# Patient Record
Sex: MALE | Race: OTHER | Employment: UNEMPLOYED | URBAN - METROPOLITAN AREA
[De-identification: names, ages, dates, MRNs, and addresses within clinical notes are randomized per-mention and may not be internally consistent; named-entity substitution may affect disease eponyms.]

---

## 2020-09-09 PROBLEM — N40.1 BENIGN PROSTATIC HYPERPLASIA WITH LOWER URINARY TRACT SYMPTOMS: Status: ACTIVE | Noted: 2020-08-02

## 2020-09-09 PROBLEM — F10.929 ALCOHOL INTOXICATION (HCC): Status: ACTIVE | Noted: 2020-08-02

## 2020-09-09 PROBLEM — R31.0 GROSS HEMATURIA: Status: ACTIVE | Noted: 2020-07-29

## 2020-09-09 PROBLEM — K76.6 PORTAL HYPERTENSION (HCC): Status: ACTIVE | Noted: 2020-08-02

## 2020-09-09 PROBLEM — E87.8 ELECTROLYTE DEPLETION: Status: ACTIVE | Noted: 2020-08-02

## 2020-09-09 PROBLEM — K72.90 HEPATIC ENCEPHALOPATHY (HCC): Status: ACTIVE | Noted: 2020-08-02

## 2020-09-09 PROBLEM — I10 HYPERTENSION: Status: ACTIVE | Noted: 2020-08-02

## 2020-09-09 PROBLEM — K70.30 ALC (ALCOHOLIC LIVER CIRRHOSIS) (HCC): Status: ACTIVE | Noted: 2020-07-28

## 2020-11-20 ENCOUNTER — LAB ENCOUNTER (OUTPATIENT)
Dept: LAB | Facility: HOSPITAL | Age: 55
End: 2020-11-20
Attending: INTERNAL MEDICINE
Payer: MEDICAID

## 2020-11-20 DIAGNOSIS — Z01.812 ENCOUNTER FOR PRE-OPERATIVE LABORATORY TESTING: ICD-10-CM

## 2020-11-23 ENCOUNTER — HOSPITAL ENCOUNTER (OUTPATIENT)
Facility: HOSPITAL | Age: 55
Setting detail: HOSPITAL OUTPATIENT SURGERY
Discharge: HOME | End: 2020-11-23
Attending: INTERNAL MEDICINE | Admitting: INTERNAL MEDICINE
Payer: MEDICAID

## 2020-11-23 ENCOUNTER — ANESTHESIA (OUTPATIENT)
Dept: ENDOSCOPY | Facility: HOSPITAL | Age: 55
End: 2020-11-23
Payer: MEDICAID

## 2020-11-23 ENCOUNTER — ANESTHESIA EVENT (OUTPATIENT)
Dept: ENDOSCOPY | Facility: HOSPITAL | Age: 55
End: 2020-11-23
Payer: MEDICAID

## 2020-11-23 VITALS
SYSTOLIC BLOOD PRESSURE: 147 MMHG | HEART RATE: 113 BPM | RESPIRATION RATE: 18 BRPM | OXYGEN SATURATION: 96 % | DIASTOLIC BLOOD PRESSURE: 91 MMHG | TEMPERATURE: 99 F | BODY MASS INDEX: 40.18 KG/M2 | WEIGHT: 250 LBS | HEIGHT: 66 IN

## 2020-11-23 DIAGNOSIS — Z12.11 COLON CANCER SCREENING: ICD-10-CM

## 2020-11-23 DIAGNOSIS — Z01.812 ENCOUNTER FOR PRE-OPERATIVE LABORATORY TESTING: Primary | ICD-10-CM

## 2020-11-23 DIAGNOSIS — K74.60 CIRRHOSIS OF LIVER WITHOUT ASCITES, UNSPECIFIED HEPATIC CIRRHOSIS TYPE (HCC): ICD-10-CM

## 2020-11-23 PROCEDURE — 88305 TISSUE EXAM BY PATHOLOGIST: CPT | Performed by: INTERNAL MEDICINE

## 2020-11-23 PROCEDURE — 88312 SPECIAL STAINS GROUP 1: CPT | Performed by: INTERNAL MEDICINE

## 2020-11-23 PROCEDURE — 0DB68ZX EXCISION OF STOMACH, VIA NATURAL OR ARTIFICIAL OPENING ENDOSCOPIC, DIAGNOSTIC: ICD-10-PCS | Performed by: INTERNAL MEDICINE

## 2020-11-23 RX ORDER — SODIUM CHLORIDE, SODIUM LACTATE, POTASSIUM CHLORIDE, CALCIUM CHLORIDE 600; 310; 30; 20 MG/100ML; MG/100ML; MG/100ML; MG/100ML
INJECTION, SOLUTION INTRAVENOUS CONTINUOUS
Status: DISCONTINUED | OUTPATIENT
Start: 2020-11-23 | End: 2020-11-23

## 2020-11-23 RX ORDER — SODIUM CHLORIDE 0.9 % (FLUSH) 0.9 %
10 SYRINGE (ML) INJECTION AS NEEDED
Status: DISCONTINUED | OUTPATIENT
Start: 2020-11-23 | End: 2020-11-23

## 2020-11-23 NOTE — OPERATIVE REPORT
EGD Operative Report    Alban Lima Patient Status:  Hospital Outpatient Surgery    1965 MRN [id number] recovery area in stable condition. Findings:    ESOPHAGUS:  There was minimal if any esophageal varices noted. No bleeding stigmata.   The esophagus for the most part was normal.  The Z-line and GE junction noted at 41cms from the incisors and was normal.

## 2020-11-23 NOTE — ANESTHESIA PREPROCEDURE EVALUATION
Anesthesia PreOp Note    HPI:     Rick Mendoza is a 50year old male who presents for preoperative consultation requested by: Mariza De León MD    Date of Surgery: 11/23/2020    Procedure(s):  ESOPHAGOGASTRODUODENOSCOPY (EGD)  COLONOSCOPY  Indication: C •  furosemide 20 MG Oral Tab, Take 1 tablet (20 mg total) by mouth daily. , Disp: 30 tablet, Rfl: 11, 11/22/2020    •  lactulose 10 GM/15ML Oral Solution, Take 30 mL (20 g total) by mouth 2 (two) times daily. , Disp: 1800 mL, Rfl: 11, 11/22/2020    •  rifaxi Physical activity        Days per week: Not on file        Minutes per session: Not on file      Stress: Not on file    Relationships      Social connections        Talks on phone: Not on file        Gets together: Not on file        [employer] height is 1.676 m (5' 6\") and weight is 113.4 kg (250 lb). His tympanic temperature is 98.6 °F (37 °C). His blood pressure is 155/112 (abnormal) and his pulse is 120.  His respiration is 19.    11/23/20  1314   BP: (!) 155/112   Pulse: 120   Resp: 19   Te

## 2020-11-23 NOTE — ANESTHESIA POSTPROCEDURE EVALUATION
Patient: Ortiz Moreno    Procedure Summary     Date: 11/23/20 Room / Location: [facility] ENDOSCOPY 04 / [facility] ENDOSCOPY    Anesthesia Start: 1330 Anesthesia Stop: 1705    Procedure: ESOPHAGOGASTRODUODENOSCOPY (EGD) (N/A ) Diagnosis:       Colon cancer screening

## 2020-11-23 NOTE — PROGRESS NOTES
You will be happy to know that your COVID 19 test returned NEGATIVE. If you need any further assistance, please feel free to call [phone]. Thank you for letting us care for you.     Best regards,   Gómez Choi MD  [address]

## 2020-11-23 NOTE — H&P
HPI:   Jennifer Baseman is a 50year old male.      Patient presents with:  Consult: Cirrhosis     This is a 51 y/o  male with pmh sig for OLIVIA +/- etoh cirrhosis who is here for follow up of his liver disease.   Patient was recently hospitalized at [facility] Has BPH and a thickened bladder 7/29/2020.   For patient need cystoscopy as outpatient   • Hepatic encephalopathy ([facility] [address] 8/2/2020   • Hypertension 8/2/2020   • Portal hypertension ([facility] [address] 8/2/2020             Past Surgical History:   Procedure Laterality Date diffuse fatty infiltration of liver with evidence of portal hypertension.  A large portosystemic shunt is noted between the portal vein, a large inferior mesenteric vein, extending into the right scrotal sac communicating via varicocele to the enlarged righ 0.00       Prothrombin Time   8.9 - 11.9 sec 11.6    INR   0.9 - 1.2 ratio 1.2             ASSESSMENT AND PLAN:      1. Cirrhosis - likely secondary to OLIVIA +/- alcohol use. MELD 8  -stopped alcohol 7/2020  -recent labs with normal LFTs and normal INR.

## 2020-12-01 NOTE — PROGRESS NOTES
11/30/2020  Sonja Bhatti  [address] [address]    Dear Wendy Oliver,       Here are the biopsy/pathology findings from your recent EGD (Upper  Endoscopy):    gastritis - an inflammation of the lining of the stomach  This was mild  No evidence of

## (undated) DEVICE — MEDI-VAC NON-CONDUCTIVE SUCTION TUBING 6MM X 1.8M (6FT.) L: Brand: CARDINAL HEALTH

## (undated) DEVICE — Device: Brand: CUSTOM PROCEDURE KIT

## (undated) DEVICE — 35 ML SYRINGE REGULAR TIP: Brand: MONOJECT

## (undated) DEVICE — CONMED SCOPE SAVER BITE BLOCK, 20X27 MM: Brand: SCOPE SAVER

## (undated) DEVICE — 6 ML SYRINGE LUER-LOCK TIP: Brand: MONOJECT

## (undated) DEVICE — STERILE LATEX POWDER-FREE SURGICAL GLOVESWITH NITRILE COATING: Brand: PROTEXIS

## (undated) DEVICE — LINE MNTR ADLT SET O2 INTMD

## (undated) DEVICE — 3 ML SYRINGE LUER-LOCK TIP: Brand: MONOJECT

## (undated) DEVICE — Device: Brand: DEFENDO AIR/WATER/SUCTION AND BIOPSY VALVE

## (undated) DEVICE — FORCEP RADIAL JAW 4

## (undated) NOTE — LETTER
11/19/2020          [address] [id number]        Dear Aamir Mcneill will be happy to know that your COVID 19 test returned NEGATIVE. If you need any further assistance, please feel free to call [phone].     Thank elías

## (undated) NOTE — LETTER
11/30/2020          Janit Lennox  [address] [address]    Dear Jennifer Michael,       Here are the biopsy/pathology findings from your recent EGD (Upper  Endoscopy):    gastritis - an inflammation of the lining of the stomach  This was mild  No